# Patient Record
Sex: FEMALE | Race: WHITE | NOT HISPANIC OR LATINO
[De-identification: names, ages, dates, MRNs, and addresses within clinical notes are randomized per-mention and may not be internally consistent; named-entity substitution may affect disease eponyms.]

---

## 2017-02-11 ENCOUNTER — CHARTING TRANS (OUTPATIENT)
Dept: OTHER | Age: 72
End: 2017-02-11

## 2018-11-05 VITALS
HEIGHT: 63 IN | BODY MASS INDEX: 31.36 KG/M2 | HEART RATE: 80 BPM | SYSTOLIC BLOOD PRESSURE: 145 MMHG | WEIGHT: 177 LBS | DIASTOLIC BLOOD PRESSURE: 80 MMHG

## 2022-10-10 ENCOUNTER — ORDER TRANSCRIPTION (OUTPATIENT)
Dept: ADMINISTRATIVE | Facility: HOSPITAL | Age: 77
End: 2022-10-10

## 2022-10-10 DIAGNOSIS — Z01.818 PREOPERATIVE EXAMINATION, UNSPECIFIED: Primary | ICD-10-CM

## 2022-10-13 RX ORDER — ATORVASTATIN CALCIUM 10 MG/1
10 TABLET, FILM COATED ORAL NIGHTLY
COMMUNITY

## 2022-10-13 RX ORDER — CHOLECALCIFEROL (VITAMIN D3) 125 MCG
2000 CAPSULE ORAL 2 TIMES DAILY
COMMUNITY

## 2022-10-13 RX ORDER — MULTIVITAMIN
1 TABLET ORAL DAILY
COMMUNITY

## 2022-10-13 RX ORDER — ASPIRIN 81 MG/1
81 TABLET ORAL DAILY
COMMUNITY

## 2022-10-13 RX ORDER — CALCIUM CARBONATE/VITAMIN D3 500 MG-10
1 TABLET ORAL 2 TIMES DAILY
COMMUNITY
End: 2022-10-13

## 2022-10-13 RX ORDER — CETIRIZINE HYDROCHLORIDE 10 MG/1
10 TABLET ORAL DAILY PRN
COMMUNITY

## 2022-10-13 RX ORDER — AMLODIPINE BESYLATE 5 MG/1
5 TABLET ORAL DAILY
COMMUNITY

## 2022-10-13 RX ORDER — CHOLECALCIFEROL (VITAMIN D3) 125 MCG
500 CAPSULE ORAL DAILY
COMMUNITY

## 2022-10-13 RX ORDER — LOSARTAN POTASSIUM 100 MG/1
100 TABLET ORAL DAILY
COMMUNITY

## 2022-10-13 RX ORDER — LEVOTHYROXINE SODIUM 0.03 MG/1
25 TABLET ORAL
COMMUNITY

## 2022-10-15 ENCOUNTER — LAB ENCOUNTER (OUTPATIENT)
Dept: LAB | Facility: HOSPITAL | Age: 77
End: 2022-10-15
Attending: INTERNAL MEDICINE
Payer: MEDICARE

## 2022-10-15 ENCOUNTER — HOSPITAL ENCOUNTER (OUTPATIENT)
Dept: GENERAL RADIOLOGY | Facility: HOSPITAL | Age: 77
Discharge: HOME | End: 2022-10-15
Attending: INTERNAL MEDICINE
Payer: MEDICARE

## 2022-10-15 DIAGNOSIS — Z01.818 PRE-OP TESTING: ICD-10-CM

## 2022-10-15 DIAGNOSIS — Z01.818 PREOPERATIVE EXAMINATION, UNSPECIFIED: Primary | ICD-10-CM

## 2022-10-15 DIAGNOSIS — Z01.818 PREOPERATIVE EXAMINATION, UNSPECIFIED: ICD-10-CM

## 2022-10-15 LAB
ANION GAP SERPL CALC-SCNC: 3 MMOL/L (ref 0–18)
BASOPHILS # BLD AUTO: 0.03 X10(3) UL (ref 0–0.2)
BASOPHILS NFR BLD AUTO: 0.4 %
BUN BLD-MCNC: 17 MG/DL (ref 7–18)
CALCIUM BLD-MCNC: 9.6 MG/DL (ref 8.5–10.1)
CHLORIDE SERPL-SCNC: 110 MMOL/L (ref 98–112)
CO2 SERPL-SCNC: 27 MMOL/L (ref 21–32)
CREAT BLD-MCNC: 0.84 MG/DL
EOSINOPHIL # BLD AUTO: 0.17 X10(3) UL (ref 0–0.7)
EOSINOPHIL NFR BLD AUTO: 2.4 %
ERYTHROCYTE [DISTWIDTH] IN BLOOD BY AUTOMATED COUNT: 13.8 %
FASTING STATUS PATIENT QL REPORTED: NO
GFR SERPLBLD BASED ON 1.73 SQ M-ARVRAT: 72 ML/MIN/1.73M2 (ref 60–?)
GLUCOSE BLD-MCNC: 93 MG/DL (ref 70–99)
HCT VFR BLD AUTO: 41.3 %
HGB BLD-MCNC: 13 G/DL
IMM GRANULOCYTES # BLD AUTO: 0.04 X10(3) UL (ref 0–1)
IMM GRANULOCYTES NFR BLD: 0.6 %
LYMPHOCYTES # BLD AUTO: 1.85 X10(3) UL (ref 1–4)
LYMPHOCYTES NFR BLD AUTO: 26.4 %
MCH RBC QN AUTO: 28.3 PG (ref 26–34)
MCHC RBC AUTO-ENTMCNC: 31.5 G/DL (ref 31–37)
MCV RBC AUTO: 89.8 FL
MONOCYTES # BLD AUTO: 0.87 X10(3) UL (ref 0.1–1)
MONOCYTES NFR BLD AUTO: 12.4 %
NEUTROPHILS # BLD AUTO: 4.05 X10 (3) UL (ref 1.5–7.7)
NEUTROPHILS # BLD AUTO: 4.05 X10(3) UL (ref 1.5–7.7)
NEUTROPHILS NFR BLD AUTO: 57.8 %
OSMOLALITY SERPL CALC.SUM OF ELEC: 291 MOSM/KG (ref 275–295)
PLATELET # BLD AUTO: 217 10(3)UL (ref 150–450)
POTASSIUM SERPL-SCNC: 4.7 MMOL/L (ref 3.5–5.1)
RBC # BLD AUTO: 4.6 X10(6)UL
SODIUM SERPL-SCNC: 140 MMOL/L (ref 136–145)
WBC # BLD AUTO: 7 X10(3) UL (ref 4–11)

## 2022-10-15 PROCEDURE — 80048 BASIC METABOLIC PNL TOTAL CA: CPT

## 2022-10-15 PROCEDURE — 36415 COLL VENOUS BLD VENIPUNCTURE: CPT

## 2022-10-15 PROCEDURE — 71046 X-RAY EXAM CHEST 2 VIEWS: CPT | Performed by: INTERNAL MEDICINE

## 2022-10-15 PROCEDURE — 85025 COMPLETE CBC W/AUTO DIFF WBC: CPT

## 2022-10-16 LAB — SARS-COV-2 RNA RESP QL NAA+PROBE: NOT DETECTED

## 2022-10-18 ENCOUNTER — HOSPITAL ENCOUNTER (OUTPATIENT)
Dept: INTERVENTIONAL RADIOLOGY/VASCULAR | Facility: HOSPITAL | Age: 77
Discharge: HOME | End: 2022-10-18
Attending: INTERNAL MEDICINE | Admitting: INTERNAL MEDICINE
Payer: MEDICARE

## 2022-10-18 VITALS
HEART RATE: 71 BPM | SYSTOLIC BLOOD PRESSURE: 134 MMHG | RESPIRATION RATE: 22 BRPM | DIASTOLIC BLOOD PRESSURE: 62 MMHG | TEMPERATURE: 97 F | OXYGEN SATURATION: 95 % | HEIGHT: 63 IN | WEIGHT: 170 LBS | BODY MASS INDEX: 30.12 KG/M2

## 2022-10-18 DIAGNOSIS — R06.09 DOE (DYSPNEA ON EXERTION): ICD-10-CM

## 2022-10-18 DIAGNOSIS — R94.39 ABNORMAL STRESS TEST: ICD-10-CM

## 2022-10-18 PROCEDURE — 93452 LEFT HRT CATH W/VENTRCLGRPHY: CPT | Performed by: INTERNAL MEDICINE

## 2022-10-18 PROCEDURE — 93458 L HRT ARTERY/VENTRICLE ANGIO: CPT | Performed by: INTERNAL MEDICINE

## 2022-10-18 PROCEDURE — 99152 MOD SED SAME PHYS/QHP 5/>YRS: CPT | Performed by: INTERNAL MEDICINE

## 2022-10-18 PROCEDURE — 4A023N7 MEASUREMENT OF CARDIAC SAMPLING AND PRESSURE, LEFT HEART, PERCUTANEOUS APPROACH: ICD-10-PCS | Performed by: INTERNAL MEDICINE

## 2022-10-18 PROCEDURE — B2111ZZ FLUOROSCOPY OF MULTIPLE CORONARY ARTERIES USING LOW OSMOLAR CONTRAST: ICD-10-PCS | Performed by: INTERNAL MEDICINE

## 2022-10-18 PROCEDURE — B2151ZZ FLUOROSCOPY OF LEFT HEART USING LOW OSMOLAR CONTRAST: ICD-10-PCS | Performed by: INTERNAL MEDICINE

## 2022-10-18 PROCEDURE — 99153 MOD SED SAME PHYS/QHP EA: CPT | Performed by: INTERNAL MEDICINE

## 2022-10-18 RX ORDER — SODIUM CHLORIDE 9 MG/ML
INJECTION, SOLUTION INTRAVENOUS
Status: DISCONTINUED | OUTPATIENT
Start: 2022-10-19 | End: 2022-10-18 | Stop reason: HOSPADM

## 2022-10-18 RX ORDER — DIPHENHYDRAMINE HYDROCHLORIDE 50 MG/ML
INJECTION INTRAMUSCULAR; INTRAVENOUS
Status: COMPLETED
Start: 2022-10-18 | End: 2022-10-18

## 2022-10-18 RX ORDER — HEPARIN SODIUM 5000 [USP'U]/ML
INJECTION, SOLUTION INTRAVENOUS; SUBCUTANEOUS
Status: COMPLETED
Start: 2022-10-18 | End: 2022-10-18

## 2022-10-18 RX ORDER — MIDAZOLAM HYDROCHLORIDE 1 MG/ML
INJECTION INTRAMUSCULAR; INTRAVENOUS
Status: COMPLETED
Start: 2022-10-18 | End: 2022-10-18

## 2022-10-18 RX ORDER — LIDOCAINE HYDROCHLORIDE 10 MG/ML
INJECTION, SOLUTION EPIDURAL; INFILTRATION; INTRACAUDAL; PERINEURAL
Status: COMPLETED
Start: 2022-10-18 | End: 2022-10-18

## 2022-10-18 NOTE — PROCEDURES
[facility]    PATIENT'S NAME: Giuseppe Bruner   ATTENDING PHYSICIAN: Emilee Verde M.D. OPERATING PHYSICIAN: Emilee Verde M.D. PATIENT ACCOUNT#:   [de-identified]    LOCATION:  [address]  MEDICAL RECORD #:   [id number]       YOB: 1945  ADMISSION DATE:       10/18/2022      OPERATION DATE:  10/18/2022    CARDIAC PROCEDURE TRANSCRIPTION    CARDIAC CATHETERIZATION    PREOPERATIVE DIAGNOSIS:    POSTOPERATIVE DIAGNOSIS:    PROCEDURE PERFORMED:      DESCRIPTION OF PROCEDURE:  Right groin was anesthetized. A 6-Malay sheath was introduced. LV angiogram revealed ejection fraction of 60%. Left coronary arteriography:  The left main is normal.  The circumflex is a codominant vessel. There is mild plaquing. The LAD has mild plaquing and reaches the apex. The right coronary artery has mild plaquing. IMPRESSION:    1. Somewhat hyperdynamic ventricle. 2.   Minimal plaquing of the left anterior descending, circumflex, and right. RECOMMENDATION:  Aggressive medical therapy.     Dictated By Emilee Verde M.D.  d: 10/18/2022 06:52:01  t: 10/18/2022 63:40:33  Job [id number]  LEVI/

## 2022-10-18 NOTE — IVS NOTE
Patient discharge home post cardiac cath. Pt is able to sit up and ambulate without difficulty. Pt's vital signs are stable. Right groin procedural access site is dry and intact with no signs and symptoms of bleeding or hematoma. Pt tolerated food/fluids. Dr. Stefan Jean Baptiste spoke to pt/family post procedure. Instructions provided and pt/family verbalized understanding. PIV removed. Patient discharged in wheelchair with all belongings.

## 2024-06-23 ENCOUNTER — APPOINTMENT (OUTPATIENT)
Dept: CT IMAGING | Facility: HOSPITAL | Age: 79
End: 2024-06-23
Attending: EMERGENCY MEDICINE

## 2024-06-23 ENCOUNTER — APPOINTMENT (OUTPATIENT)
Dept: GENERAL RADIOLOGY | Facility: HOSPITAL | Age: 79
End: 2024-06-23
Attending: EMERGENCY MEDICINE

## 2024-06-23 ENCOUNTER — HOSPITAL ENCOUNTER (EMERGENCY)
Facility: HOSPITAL | Age: 79
Discharge: HOME | End: 2024-06-23
Attending: EMERGENCY MEDICINE

## 2024-06-23 VITALS
TEMPERATURE: 97 F | BODY MASS INDEX: 30.12 KG/M2 | WEIGHT: 170 LBS | SYSTOLIC BLOOD PRESSURE: 153 MMHG | OXYGEN SATURATION: 96 % | HEIGHT: 63 IN | HEART RATE: 68 BPM | RESPIRATION RATE: 18 BRPM | DIASTOLIC BLOOD PRESSURE: 62 MMHG

## 2024-06-23 DIAGNOSIS — N21.0 URINARY BLADDER STONE: Primary | ICD-10-CM

## 2024-06-23 LAB
ALBUMIN SERPL-MCNC: 4.5 G/DL (ref 3.4–5)
ALBUMIN/GLOB SERPL: 1.3 {RATIO} (ref 1–2)
ALP LIVER SERPL-CCNC: 57 U/L
ALT SERPL-CCNC: 28 U/L
ANION GAP SERPL CALC-SCNC: 7 MMOL/L (ref 0–18)
AST SERPL-CCNC: 15 U/L (ref 15–37)
BASOPHILS # BLD AUTO: 0.02 X10(3) UL (ref 0–0.2)
BASOPHILS NFR BLD AUTO: 0.2 %
BILIRUB SERPL-MCNC: 0.6 MG/DL (ref 0.1–2)
BILIRUB UR QL STRIP.AUTO: NEGATIVE
BUN BLD-MCNC: 27 MG/DL (ref 9–23)
CALCIUM BLD-MCNC: 9.9 MG/DL (ref 8.5–10.1)
CHLORIDE SERPL-SCNC: 109 MMOL/L (ref 98–112)
CLARITY UR REFRACT.AUTO: CLEAR
CO2 SERPL-SCNC: 22 MMOL/L (ref 21–32)
CREAT BLD-MCNC: 1.15 MG/DL
EGFRCR SERPLBLD CKD-EPI 2021: 49 ML/MIN/1.73M2 (ref 60–?)
EOSINOPHIL # BLD AUTO: 0.07 X10(3) UL (ref 0–0.7)
EOSINOPHIL NFR BLD AUTO: 0.6 %
ERYTHROCYTE [DISTWIDTH] IN BLOOD BY AUTOMATED COUNT: 13.3 %
GLOBULIN PLAS-MCNC: 3.4 G/DL (ref 2.8–4.4)
GLUCOSE BLD-MCNC: 124 MG/DL (ref 70–99)
GLUCOSE UR STRIP.AUTO-MCNC: NORMAL MG/DL
HCT VFR BLD AUTO: 41.6 %
HGB BLD-MCNC: 13.6 G/DL
IMM GRANULOCYTES # BLD AUTO: 0.06 X10(3) UL (ref 0–1)
IMM GRANULOCYTES NFR BLD: 0.5 %
LEUKOCYTE ESTERASE UR QL STRIP.AUTO: 250
LIPASE SERPL-CCNC: 56 U/L (ref 13–75)
LYMPHOCYTES # BLD AUTO: 1.22 X10(3) UL (ref 1–4)
LYMPHOCYTES NFR BLD AUTO: 10.3 %
MCH RBC QN AUTO: 28.4 PG (ref 26–34)
MCHC RBC AUTO-ENTMCNC: 32.7 G/DL (ref 31–37)
MCV RBC AUTO: 86.8 FL
MONOCYTES # BLD AUTO: 0.88 X10(3) UL (ref 0.1–1)
MONOCYTES NFR BLD AUTO: 7.4 %
NEUTROPHILS # BLD AUTO: 9.61 X10 (3) UL (ref 1.5–7.7)
NEUTROPHILS # BLD AUTO: 9.61 X10(3) UL (ref 1.5–7.7)
NEUTROPHILS NFR BLD AUTO: 81 %
NITRITE UR QL STRIP.AUTO: NEGATIVE
OSMOLALITY SERPL CALC.SUM OF ELEC: 293 MOSM/KG (ref 275–295)
PH UR STRIP.AUTO: 5 [PH] (ref 5–8)
PLATELET # BLD AUTO: 221 10(3)UL (ref 150–450)
POTASSIUM SERPL-SCNC: 4.2 MMOL/L (ref 3.5–5.1)
PROT SERPL-MCNC: 7.9 G/DL (ref 6.4–8.2)
PROT UR STRIP.AUTO-MCNC: NEGATIVE MG/DL
RBC # BLD AUTO: 4.79 X10(6)UL
RBC #/AREA URNS AUTO: >10 /HPF
SODIUM SERPL-SCNC: 138 MMOL/L (ref 136–145)
SP GR UR STRIP.AUTO: 1.01 (ref 1–1.03)
UROBILINOGEN UR STRIP.AUTO-MCNC: NORMAL MG/DL
WBC # BLD AUTO: 11.9 X10(3) UL (ref 4–11)

## 2024-06-23 PROCEDURE — 74177 CT ABD & PELVIS W/CONTRAST: CPT | Performed by: EMERGENCY MEDICINE

## 2024-06-23 PROCEDURE — 85025 COMPLETE CBC W/AUTO DIFF WBC: CPT | Performed by: EMERGENCY MEDICINE

## 2024-06-23 PROCEDURE — 99285 EMERGENCY DEPT VISIT HI MDM: CPT

## 2024-06-23 PROCEDURE — 96361 HYDRATE IV INFUSION ADD-ON: CPT

## 2024-06-23 PROCEDURE — 96375 TX/PRO/DX INJ NEW DRUG ADDON: CPT

## 2024-06-23 PROCEDURE — 87086 URINE CULTURE/COLONY COUNT: CPT | Performed by: EMERGENCY MEDICINE

## 2024-06-23 PROCEDURE — 96374 THER/PROPH/DIAG INJ IV PUSH: CPT

## 2024-06-23 PROCEDURE — 93010 ELECTROCARDIOGRAM REPORT: CPT

## 2024-06-23 PROCEDURE — 81001 URINALYSIS AUTO W/SCOPE: CPT | Performed by: EMERGENCY MEDICINE

## 2024-06-23 PROCEDURE — 93005 ELECTROCARDIOGRAM TRACING: CPT

## 2024-06-23 PROCEDURE — 80053 COMPREHEN METABOLIC PANEL: CPT | Performed by: EMERGENCY MEDICINE

## 2024-06-23 PROCEDURE — 83690 ASSAY OF LIPASE: CPT | Performed by: EMERGENCY MEDICINE

## 2024-06-23 PROCEDURE — 71045 X-RAY EXAM CHEST 1 VIEW: CPT | Performed by: EMERGENCY MEDICINE

## 2024-06-23 RX ORDER — SIMVASTATIN 20 MG
20 TABLET ORAL NIGHTLY
COMMUNITY

## 2024-06-23 RX ORDER — SODIUM CHLORIDE 9 MG/ML
INJECTION, SOLUTION INTRAVENOUS CONTINUOUS
Status: DISCONTINUED | OUTPATIENT
Start: 2024-06-23 | End: 2024-06-23

## 2024-06-23 RX ORDER — TAMSULOSIN HYDROCHLORIDE 0.4 MG/1
0.4 CAPSULE ORAL DAILY
Qty: 7 CAPSULE | Refills: 0 | Status: SHIPPED | OUTPATIENT
Start: 2024-06-23 | End: 2024-06-30

## 2024-06-23 RX ORDER — KETOROLAC TROMETHAMINE 15 MG/ML
15 INJECTION, SOLUTION INTRAMUSCULAR; INTRAVENOUS ONCE
Status: COMPLETED | OUTPATIENT
Start: 2024-06-23 | End: 2024-06-23

## 2024-06-23 RX ORDER — ONDANSETRON 2 MG/ML
4 INJECTION INTRAMUSCULAR; INTRAVENOUS ONCE
Status: COMPLETED | OUTPATIENT
Start: 2024-06-23 | End: 2024-06-23

## 2024-06-24 LAB
ATRIAL RATE: 71 BPM
P AXIS: 61 DEGREES
P-R INTERVAL: 150 MS
Q-T INTERVAL: 404 MS
QRS DURATION: 128 MS
QTC CALCULATION (BEZET): 439 MS
R AXIS: 9 DEGREES
T AXIS: 129 DEGREES
VENTRICULAR RATE: 71 BPM

## 2024-06-24 NOTE — ED INITIAL ASSESSMENT (HPI)
Pt ambulatory to ED c/o LLQ and RLQ abdominal pain that started yesterday, began vomiting today around 3 pm, states last bowel movement Thursday, denies dysuria/fever

## 2024-06-24 NOTE — ED PROVIDER NOTES
Patient Seen in: [facility] Emergency Department      History     Chief Complaint   Patient presents with    Abdomen/Flank Pain    Nausea/Vomiting/Diarrhea     Stated Complaint: abd pain and vomiting    Subjective:   HPI    78-year-old female comes to the hospital the complaint of having difficulty with discomfort in the area of her abdomen throughout the day.  Greater in the lower area.  She is having some difficulty urinating with some discomfort.  She has had nausea and about 10 episodes of vomiting as well.  Denies any diarrhea.  She has no known fevers.  Denies any headaches or dizziness.  No chest pain or shortness of breath.  She has no known fevers.  There is nothing specific makes it better or worse and she denying any other complaints at this time.  He does have a history of having a hysterectomy in the past.    Objective:   Past Medical History:    Abnormal EKG    Abnormal nuclear stress test    Disorder of thyroid    RANDALL (dyspnea on exertion)    High blood pressure    High cholesterol    LBBB (left bundle branch block)              Past Surgical History:   Procedure Laterality Date    Hysterectomy                  Social History     Socioeconomic History    Marital status:    Tobacco Use    Smoking status: Never    Smokeless tobacco: Never   Vaping Use    Vaping status: Never Used   Substance and Sexual Activity    Alcohol use: Never    Drug use: Never     Social Determinants of Health      Received from [facility]    Social Connections    Received from [facility]    Housing Stability              Review of Systems    Positive for stated complaint: abd pain and vomiting  Other systems are as noted in HPI.  Constitutional and vital signs reviewed.      All other systems reviewed and negative except as noted above.    Physical Exam     ED Triage Vitals [06/23/24 1935]   BP (!) 167/84   Pulse 85   Resp 22   Temp 96.8 °F (36 °C)   Temp src Temporal   SpO2  97 %   O2 Device None (Room air)       Current Vitals:   Vital Signs  BP: 150/65  Pulse: 70  Resp: (!) 27  Temp: 96.8 °F (36 °C)  Temp src: Temporal  MAP (mmHg): 91    Oxygen Therapy  SpO2: 97 %  O2 Device: None (Room air)            Physical Exam    HEENT : NCAT, EOMI, PEERL,  neck supple, no JVD, trachea midline, No LAD  Heart: S1S2 normal. No murmurs, regular rate and rhythm  Lungs: Clear to auscultation bilaterally  Abdomen: Soft suprapubic and left lower quadrant regions are tender nondistended normal active bowel sounds without rebound, guarding or masses noted  Back nontender without CVA tenderness  Extremity no clubbing, cyanosis or edema noted.  Full range of motion noted without tenderness  Neuro: No focal deficits noted    All measures to prevent infection transmission during my interaction with the patient were taken.  The patient was already wearing droplet mask on my arrival to the room.  Personal protective equipment including a droplet mask as well as gloves were worn throughout the duration of my exam.  Hand washing was performed prior to and after the exam.  Stethoscope and equipment used during my examination was cleaned with a super Sani cloth germicidal wipe following the exam.    ED Course     Labs Reviewed   COMP METABOLIC PANEL (14) - Abnormal; Notable for the following components:       Result Value    Glucose 124 (*)     BUN 27 (*)     Creatinine 1.15 (*)     eGFR-Cr 49 (*)     All other components within normal limits   URINALYSIS WITH CULTURE REFLEX - Abnormal; Notable for the following components:    Ketones Urine Trace (*)     Blood Urine 2+ (*)     Leukocyte Esterase Urine 250 (*)     WBC Urine 6-10 (*)     RBC Urine >10 (*)     Bacteria Urine Rare (*)     All other components within normal limits   CBC W/ DIFFERENTIAL - Abnormal; Notable for the following components:    WBC 11.9 (*)     Neutrophil Absolute Prelim 9.61 (*)     Neutrophil Absolute 9.61 (*)     All other components  within normal limits   LIPASE - Normal   CBC WITH DIFFERENTIAL WITH PLATELET    Narrative:     The following orders were created for panel order CBC With Differential With Platelet.  Procedure                               Abnormality         Status                     ---------                               -----------         ------                     CBC W/ DIFFERENTIAL[[id number]]          Abnormal            Final result                 Please view results for these tests on the individual orders.   RAINBOW DRAW GOLD   RAINBOW DRAW BLUE   URINE CULTURE, ROUTINE     EKG    Rate, intervals and axes as noted on EKG Report.  Rate: 71  Rhythm: Sinus Rhythm  Reading: , QRS of 128, patient has a normal sinus rhythm with left bundle branch block noted.              ED Course as of 06/23/24 2134  ------------------------------------------------------------  Time: 06/23 2132  Comment: While here the patient had a urinalysis with red cells.  The CMP was unremarkable.  The patient's lipase is 56.  The patient's white count is 11.9 thousand.  The patient had a chest x-ray that upper-central but it showed no acute cardiopulmonary process.  Read the radiology report as well.  She had a CT of the abdomen pelvis which was consistent with a bladder stone and recently passed stone per radiology.     CT ABDOMEN+PELVIS(CONTRAST ONLY)(CPT=74177)    Result Date: 6/23/2024  PROCEDURE:  CT ABDOMEN+PELVIS (CONTRAST ONLY) (CPT=74177)  COMPARISON:  None.  INDICATIONS:  abd pain and vomiting  TECHNIQUE:  CT scanning was performed from the dome of the diaphragm to the pubic symphysis with non-ionic intravenous contrast material. Post contrast coronal MPR imaging was performed.  Dose reduction techniques were used. Dose information is transmitted to the ACR (American College of Radiology) NRDR (National Radiology Data Registry) which includes the Dose Index Registry.  PATIENT STATED HISTORY:(As transcribed by Technologist)  LLQ and RLQ  abd pain.   CONTRAST USED:  80cc of Isovue 370  FINDINGS:  LIVER:  There is a 6 x 4 mm hypoattenuating lesion within the left lobe of the liver in hepatic segment 4A.  The lesion is too small to definitively characterize.  In the absence of any known malignancy or risk factors for hepatoma this is considered benign. BILIARY:  No visible dilatation or calcification.  PANCREAS:  Mild to moderate pancreatic atrophy.  No ductal dilatation or mass identified. SPLEEN:  No enlargement or focal lesion.  KIDNEYS:  There is mild left hydronephrosis and mild left hydroureter.  There is a 2 mm calculus identified in the urinary bladder medial and below the level of the left ureteral vesical junction.  Findings suggest a recently passed calculus.  There is also some mild stranding in the left perinephric fat which could reflect post obstructive changes of pyelo tubular backflow.  No right-sided urinary tract calculi identified.  No right hydronephrosis or right hydroureter. ADRENALS:  No mass or enlargement.  AORTA/VASCULAR:  Atheromatous plaque identified in the aorta and iliac arteries.  No evidence for aortic aneurysm. RETROPERITONEUM:  No mass or adenopathy.  BOWEL/MESENTERY:  There is a moderate amount of stool within the colon.  There is no evidence for bowel obstruction.  There is no free air or free fluid.  There is a normal-appearing appendix. ABDOMINAL WALL:  No mass or hernia.  URINARY BLADDER:  There is a 2 mm calculus within the posterior dependent portion of the left side of the bladder below the left ureteral vesical junction.  Bladder is not distended.  No obvious wall thickening. PELVIC NODES:  No adenopathy.  PELVIC ORGANS:  Status post hysterectomy. BONES:  Degenerative changes are seen in the lumbar spine with disc space narrowing and vacuum disc at L5-S1 and disc space narrowing at L2-3.  Schmorl's node is noted involving the superior endplate of L3. LUNG BASES:  There is a noncalcified subpleural left lower  lobe 3 mm lung nodule noted.  Follow-up as per Fleischner criteria recommended. OTHER:  Lipomatous lesion is seen in the left hip contiguous with the greater trochanter extending posteriorly adjacent to the gluteus roopa measuring 12.1 x 6.0 x 3.0 cm.            CONCLUSION:  1. There is mild left hydronephrosis and mild left hydroureter.  There is a 2 mm calculus located in the urinary bladder slightly caudal and medial to the left ureteral vesical junction.  The findings suggest a recently passed calculus.  Correlate with clinical findings. 2. There is a large lipomatous lesion in the posterior left hip measuring 12.1 x 6.0 x 3.0 cm.  No obvious stranding or contrast enhancement.  MRI would better evaluate the lipomatous lesion if indicated clinically. 3. There is a noncalcified 3 mm left lower lobe lung nodule.  Follow-up as per Fleischner criteria recommended. The findings include a single, incidentally detected, solid pulmonary nodule, measuring less than 6mm.  2017 guidelines from the Fleischner Society for the follow-up and management of incidentally detected indeterminate pulmonary nodules in persons at least 35 years of age depend on nodule size (average length and width) and underlying risk factors (including smoking and other risk factors). Please consider the following recommendations after clinical assessment of risk factors.  For <6mm solid nodules: In low risk patients, no follow-up required.  If suspicious morphology or upper lobe location, consider 12 month follow-up.  In high risk patients, optional CT in 12 months.   4. Other incidental findings noted as detailed above.    LOCATION:  [place]   Dictated by (CST): Logan Ma MD on 6/23/2024 at 9:14 PM     Finalized by (CST): Logan Ma MD on 6/23/2024 at 9:27 PM       XR CHEST AP PORTABLE  (CPT=71045)    Result Date: 6/23/2024  PROCEDURE:  XR CHEST AP PORTABLE  (CPT=71045)  TECHNIQUE:  AP chest radiograph was obtained.  COMPARISON:   [facility] , XR, XR CHEST PA + LAT CHEST (CPT=71046), 10/15/2022, 10:17 AM.  INDICATIONS:  abd pain and vomiting  PATIENT STATED HISTORY: (As transcribed by Technologist)  Patient stated having vomiting episodes today.              CONCLUSION:  Normal heart size and vascularity.  No effusion.  No CHF. The lungs are clear.  No mass or pneumonia.  No lymphadenopathy. No acute disease.   LOCATION:  [facility]      Dictated by (CST): Aaron José MD on 6/23/2024 at 8:32 PM     Finalized by (CST): Aaron José MD on 6/23/2024 at 8:33 PM        Medications   sodium chloride 0.9 % IV bolus 500 mL (0 mL Intravenous Stopped 6/23/24 2120)     Followed by   sodium chloride 0.9% infusion ( Intravenous New Bag 6/23/24 2123)   ondansetron (Zofran) 4 MG/2ML injection 4 mg (4 mg Intravenous Given 6/23/24 2005)   ketorolac (Toradol) 15 MG/ML injection 15 mg (15 mg Intravenous Given 6/23/24 2005)   iopamidol 76% (ISOVUE-370) injection for power injector (80 mL Intravenous Given 6/23/24 2103)              MDM      Differential diagnosis included obstruction pyelonephritis ureteral stone but not limited such.  Patient here with diagnosed ureteral stone that has passed in the area of the bladder.  He is pain-free at this time we discharged home for the outpatient management care.  Urology referrals been given.      Patient was screened and evaluated during this visit.   As a treating physician attending to the patient, I determined, within reasonable clinical confidence and prior to discharge, that an emergency medical condition was not or was no longer present.  There was no indication for further evaluation, treatment or admission on an emergency basis.       The usual and customary discharge instuctions were discussed given the patient's ER course.  We discussed signs and symptoms that should prompt the patient's immediate return to the emergency department.   Reasonable over the counter and prescription treatment options and  Physician follow up plan was discussed.       The patient is discharged in good condition.     This note was prepared using Dragon Medical voice recognition dictation software.  As a result errors may occur.  When identified to these areas have been corrected.  While every attempt is made to correct errors during dictation discrepancies may still exist.  Please contact if there are any errors.                                   Medical Decision Making      Disposition and Plan     Clinical Impression:  1. Urinary bladder stone         Disposition:  Discharge  6/23/2024  9:34 pm    Follow-up:  Nikky Arcos DO  [address] [facility]  [phone]    Schedule an appointment as soon as possible for a visit in 2 day(s)      Ariel Lee MD  [address] [facility]  [address]  [place] [address]  [phone]    Schedule an appointment as soon as possible for a visit            Medications Prescribed:  Current Discharge Medication List        START taking these medications    Details   tamsulosin (FLOMAX) 0.4 MG Oral Cap Take 1 capsule (0.4 mg total) by mouth daily for 7 days.  Qty: 7 capsule, Refills: 0